# Patient Record
Sex: MALE | Race: WHITE | NOT HISPANIC OR LATINO | URBAN - METROPOLITAN AREA
[De-identification: names, ages, dates, MRNs, and addresses within clinical notes are randomized per-mention and may not be internally consistent; named-entity substitution may affect disease eponyms.]

---

## 2018-07-25 ENCOUNTER — APPOINTMENT (OUTPATIENT)
Dept: RADIOLOGY | Facility: MEDICAL CENTER | Age: 68
End: 2018-07-25
Attending: EMERGENCY MEDICINE
Payer: MEDICARE

## 2018-07-25 ENCOUNTER — HOSPITAL ENCOUNTER (OUTPATIENT)
Dept: RADIOLOGY | Facility: MEDICAL CENTER | Age: 68
End: 2018-07-25

## 2018-07-25 ENCOUNTER — HOSPITAL ENCOUNTER (OUTPATIENT)
Facility: MEDICAL CENTER | Age: 68
End: 2018-07-26
Attending: EMERGENCY MEDICINE | Admitting: INTERNAL MEDICINE
Payer: MEDICARE

## 2018-07-25 DIAGNOSIS — E78.5 DYSLIPIDEMIA: ICD-10-CM

## 2018-07-25 DIAGNOSIS — G45.1 HEMISPHERIC CAROTID ARTERY SYNDROME: ICD-10-CM

## 2018-07-25 DIAGNOSIS — I10 ESSENTIAL HYPERTENSION: ICD-10-CM

## 2018-07-25 PROCEDURE — 700102 HCHG RX REV CODE 250 W/ 637 OVERRIDE(OP): Performed by: INTERNAL MEDICINE

## 2018-07-25 PROCEDURE — A9270 NON-COVERED ITEM OR SERVICE: HCPCS | Performed by: INTERNAL MEDICINE

## 2018-07-25 PROCEDURE — G0378 HOSPITAL OBSERVATION PER HR: HCPCS

## 2018-07-25 PROCEDURE — 99220 PR INITIAL OBSERVATION CARE,LEVL III: CPT | Performed by: INTERNAL MEDICINE

## 2018-07-25 PROCEDURE — 93306 TTE W/DOPPLER COMPLETE: CPT | Mod: 26 | Performed by: INTERNAL MEDICINE

## 2018-07-25 PROCEDURE — 93005 ELECTROCARDIOGRAM TRACING: CPT | Performed by: EMERGENCY MEDICINE

## 2018-07-25 PROCEDURE — 71045 X-RAY EXAM CHEST 1 VIEW: CPT

## 2018-07-25 PROCEDURE — 94760 N-INVAS EAR/PLS OXIMETRY 1: CPT

## 2018-07-25 PROCEDURE — 99285 EMERGENCY DEPT VISIT HI MDM: CPT

## 2018-07-25 RX ORDER — ASPIRIN 81 MG/1
324 TABLET, CHEWABLE ORAL DAILY
Status: DISCONTINUED | OUTPATIENT
Start: 2018-07-26 | End: 2018-07-26 | Stop reason: HOSPADM

## 2018-07-25 RX ORDER — ACETAMINOPHEN 325 MG/1
650 TABLET ORAL EVERY 6 HOURS PRN
Status: DISCONTINUED | OUTPATIENT
Start: 2018-07-25 | End: 2018-07-26 | Stop reason: HOSPADM

## 2018-07-25 RX ORDER — LABETALOL HYDROCHLORIDE 5 MG/ML
10 INJECTION, SOLUTION INTRAVENOUS EVERY 4 HOURS PRN
Status: DISCONTINUED | OUTPATIENT
Start: 2018-07-25 | End: 2018-07-26 | Stop reason: HOSPADM

## 2018-07-25 RX ORDER — ASPIRIN 325 MG
325 TABLET ORAL DAILY
Status: DISCONTINUED | OUTPATIENT
Start: 2018-07-26 | End: 2018-07-26 | Stop reason: HOSPADM

## 2018-07-25 RX ORDER — BISACODYL 10 MG
10 SUPPOSITORY, RECTAL RECTAL
Status: DISCONTINUED | OUTPATIENT
Start: 2018-07-25 | End: 2018-07-26 | Stop reason: HOSPADM

## 2018-07-25 RX ORDER — CLOPIDOGREL BISULFATE 75 MG/1
75 TABLET ORAL EVERY EVENING
COMMUNITY

## 2018-07-25 RX ORDER — ONDANSETRON 4 MG/1
4 TABLET, ORALLY DISINTEGRATING ORAL EVERY 4 HOURS PRN
Status: DISCONTINUED | OUTPATIENT
Start: 2018-07-25 | End: 2018-07-26 | Stop reason: HOSPADM

## 2018-07-25 RX ORDER — ATORVASTATIN CALCIUM 40 MG/1
40 TABLET, FILM COATED ORAL EVERY MORNING
COMMUNITY

## 2018-07-25 RX ORDER — LISINOPRIL 20 MG/1
20 TABLET ORAL EVERY MORNING
COMMUNITY

## 2018-07-25 RX ORDER — ATORVASTATIN CALCIUM 80 MG/1
80 TABLET, FILM COATED ORAL EVERY EVENING
Status: DISCONTINUED | OUTPATIENT
Start: 2018-07-25 | End: 2018-07-26 | Stop reason: HOSPADM

## 2018-07-25 RX ORDER — ASPIRIN 300 MG/1
300 SUPPOSITORY RECTAL DAILY
Status: DISCONTINUED | OUTPATIENT
Start: 2018-07-26 | End: 2018-07-26 | Stop reason: HOSPADM

## 2018-07-25 RX ORDER — AMOXICILLIN 250 MG
2 CAPSULE ORAL 2 TIMES DAILY
Status: DISCONTINUED | OUTPATIENT
Start: 2018-07-25 | End: 2018-07-26 | Stop reason: HOSPADM

## 2018-07-25 RX ORDER — POLYETHYLENE GLYCOL 3350 17 G/17G
1 POWDER, FOR SOLUTION ORAL
Status: DISCONTINUED | OUTPATIENT
Start: 2018-07-25 | End: 2018-07-26 | Stop reason: HOSPADM

## 2018-07-25 RX ORDER — ONDANSETRON 2 MG/ML
4 INJECTION INTRAMUSCULAR; INTRAVENOUS EVERY 4 HOURS PRN
Status: DISCONTINUED | OUTPATIENT
Start: 2018-07-25 | End: 2018-07-26 | Stop reason: HOSPADM

## 2018-07-25 RX ADMIN — ATORVASTATIN CALCIUM 80 MG: 80 TABLET, FILM COATED ORAL at 23:06

## 2018-07-25 ASSESSMENT — ENCOUNTER SYMPTOMS
SORE THROAT: 0
SPUTUM PRODUCTION: 0
FEVER: 0
PALPITATIONS: 0
VOMITING: 0
DIZZINESS: 0
SHORTNESS OF BREATH: 0
HEADACHES: 1
ABDOMINAL PAIN: 0
BLURRED VISION: 0
WHEEZING: 0
DIAPHORESIS: 0
MYALGIAS: 0
BACK PAIN: 0
COUGH: 0
DIARRHEA: 0
FLANK PAIN: 0
NECK PAIN: 0
SPEECH CHANGE: 1
FOCAL WEAKNESS: 0
NAUSEA: 0
CHILLS: 0
SEIZURES: 0
BRUISES/BLEEDS EASILY: 0
BLOOD IN STOOL: 0

## 2018-07-25 ASSESSMENT — COGNITIVE AND FUNCTIONAL STATUS - GENERAL
SUGGESTED CMS G CODE MODIFIER MOBILITY: CH
MOBILITY SCORE: 24
SUGGESTED CMS G CODE MODIFIER DAILY ACTIVITY: CH
DAILY ACTIVITIY SCORE: 24

## 2018-07-25 ASSESSMENT — LIFESTYLE VARIABLES
HAVE PEOPLE ANNOYED YOU BY CRITICIZING YOUR DRINKING: NO
EVER FELT BAD OR GUILTY ABOUT YOUR DRINKING: NO
ON A TYPICAL DAY WHEN YOU DRINK ALCOHOL HOW MANY DRINKS DO YOU HAVE: 1
TOTAL SCORE: 0
AVERAGE NUMBER OF DAYS PER WEEK YOU HAVE A DRINK CONTAINING ALCOHOL: 5
TOTAL SCORE: 0
HOW MANY TIMES IN THE PAST YEAR HAVE YOU HAD 5 OR MORE DRINKS IN A DAY: 0
CONSUMPTION TOTAL: NEGATIVE
TOTAL SCORE: 0
EVER HAD A DRINK FIRST THING IN THE MORNING TO STEADY YOUR NERVES TO GET RID OF A HANGOVER: NO
HAVE YOU EVER FELT YOU SHOULD CUT DOWN ON YOUR DRINKING: NO
EVER_SMOKED: NEVER
ALCOHOL_USE: YES

## 2018-07-25 ASSESSMENT — PATIENT HEALTH QUESTIONNAIRE - PHQ9
2. FEELING DOWN, DEPRESSED, IRRITABLE, OR HOPELESS: NOT AT ALL
1. LITTLE INTEREST OR PLEASURE IN DOING THINGS: NOT AT ALL
SUM OF ALL RESPONSES TO PHQ9 QUESTIONS 1 AND 2: 0

## 2018-07-25 ASSESSMENT — COPD QUESTIONNAIRES
DO YOU EVER COUGH UP ANY MUCUS OR PHLEGM?: NO/ONLY WITH OCCASIONAL COLDS OR INFECTIONS
HAVE YOU SMOKED AT LEAST 100 CIGARETTES IN YOUR ENTIRE LIFE: NO/DON'T KNOW
COPD SCREENING SCORE: 2
IN THE PAST 12 MONTHS DO YOU DO LESS THAN YOU USED TO BECAUSE OF YOUR BREATHING PROBLEMS: DISAGREE/UNSURE
DURING THE PAST 4 WEEKS HOW MUCH DID YOU FEEL SHORT OF BREATH: NONE/LITTLE OF THE TIME

## 2018-07-25 ASSESSMENT — PAIN SCALES - GENERAL
PAINLEVEL_OUTOF10: 0

## 2018-07-26 ENCOUNTER — APPOINTMENT (OUTPATIENT)
Dept: RADIOLOGY | Facility: MEDICAL CENTER | Age: 68
End: 2018-07-26
Attending: INTERNAL MEDICINE
Payer: MEDICARE

## 2018-07-26 VITALS
SYSTOLIC BLOOD PRESSURE: 132 MMHG | RESPIRATION RATE: 18 BRPM | WEIGHT: 168.43 LBS | DIASTOLIC BLOOD PRESSURE: 89 MMHG | OXYGEN SATURATION: 97 % | HEART RATE: 61 BPM | TEMPERATURE: 99 F | BODY MASS INDEX: 27.07 KG/M2 | HEIGHT: 66 IN

## 2018-07-26 PROBLEM — I10 ESSENTIAL HYPERTENSION: Status: ACTIVE | Noted: 2018-07-26

## 2018-07-26 PROBLEM — G45.9 TIA (TRANSIENT ISCHEMIC ATTACK): Status: RESOLVED | Noted: 2018-07-26 | Resolved: 2018-07-26

## 2018-07-26 PROBLEM — E78.5 HYPERLIPIDEMIA: Status: ACTIVE | Noted: 2018-07-26

## 2018-07-26 PROBLEM — G45.9 TIA (TRANSIENT ISCHEMIC ATTACK): Status: ACTIVE | Noted: 2018-07-26

## 2018-07-26 LAB
ALBUMIN SERPL BCP-MCNC: 4.3 G/DL (ref 3.2–4.9)
ALBUMIN/GLOB SERPL: 2.2 G/DL
ALP SERPL-CCNC: 49 U/L (ref 30–99)
ALT SERPL-CCNC: 23 U/L (ref 2–50)
ANION GAP SERPL CALC-SCNC: 10 MMOL/L (ref 0–11.9)
AST SERPL-CCNC: 26 U/L (ref 12–45)
BASOPHILS # BLD AUTO: 0.7 % (ref 0–1.8)
BASOPHILS # BLD: 0.05 K/UL (ref 0–0.12)
BILIRUB SERPL-MCNC: 1.2 MG/DL (ref 0.1–1.5)
BUN SERPL-MCNC: 15 MG/DL (ref 8–22)
CALCIUM SERPL-MCNC: 9.4 MG/DL (ref 8.5–10.5)
CHLORIDE SERPL-SCNC: 104 MMOL/L (ref 96–112)
CHOLEST SERPL-MCNC: 165 MG/DL (ref 100–199)
CO2 SERPL-SCNC: 24 MMOL/L (ref 20–33)
CREAT SERPL-MCNC: 1.08 MG/DL (ref 0.5–1.4)
EOSINOPHIL # BLD AUTO: 0.2 K/UL (ref 0–0.51)
EOSINOPHIL NFR BLD: 2.9 % (ref 0–6.9)
ERYTHROCYTE [DISTWIDTH] IN BLOOD BY AUTOMATED COUNT: 45.5 FL (ref 35.9–50)
EST. AVERAGE GLUCOSE BLD GHB EST-MCNC: 120 MG/DL
GLOBULIN SER CALC-MCNC: 2 G/DL (ref 1.9–3.5)
GLUCOSE SERPL-MCNC: 101 MG/DL (ref 65–99)
HBA1C MFR BLD: 5.8 % (ref 0–5.6)
HCT VFR BLD AUTO: 42.8 % (ref 42–52)
HDLC SERPL-MCNC: 47 MG/DL
HGB BLD-MCNC: 14.8 G/DL (ref 14–18)
IMM GRANULOCYTES # BLD AUTO: 0.01 K/UL (ref 0–0.11)
IMM GRANULOCYTES NFR BLD AUTO: 0.1 % (ref 0–0.9)
LDLC SERPL CALC-MCNC: 82 MG/DL
LV EJECT FRACT  99904: 60
LV EJECT FRACT MOD 2C 99903: 65.79
LV EJECT FRACT MOD 4C 99902: 63.45
LV EJECT FRACT MOD BP 99901: 64.58
LYMPHOCYTES # BLD AUTO: 2.29 K/UL (ref 1–4.8)
LYMPHOCYTES NFR BLD: 32.7 % (ref 22–41)
MCH RBC QN AUTO: 30.8 PG (ref 27–33)
MCHC RBC AUTO-ENTMCNC: 34.6 G/DL (ref 33.7–35.3)
MCV RBC AUTO: 89.2 FL (ref 81.4–97.8)
MONOCYTES # BLD AUTO: 0.75 K/UL (ref 0–0.85)
MONOCYTES NFR BLD AUTO: 10.7 % (ref 0–13.4)
NEUTROPHILS # BLD AUTO: 3.71 K/UL (ref 1.82–7.42)
NEUTROPHILS NFR BLD: 52.9 % (ref 44–72)
NRBC # BLD AUTO: 0 K/UL
NRBC BLD-RTO: 0 /100 WBC
PLATELET # BLD AUTO: 280 K/UL (ref 164–446)
PMV BLD AUTO: 9.4 FL (ref 9–12.9)
POTASSIUM SERPL-SCNC: 3.7 MMOL/L (ref 3.6–5.5)
PROT SERPL-MCNC: 6.3 G/DL (ref 6–8.2)
RBC # BLD AUTO: 4.8 M/UL (ref 4.7–6.1)
SODIUM SERPL-SCNC: 138 MMOL/L (ref 135–145)
TRIGL SERPL-MCNC: 182 MG/DL (ref 0–149)
TSH SERPL DL<=0.005 MIU/L-ACNC: 1.92 UIU/ML (ref 0.38–5.33)
WBC # BLD AUTO: 7 K/UL (ref 4.8–10.8)

## 2018-07-26 PROCEDURE — 70544 MR ANGIOGRAPHY HEAD W/O DYE: CPT

## 2018-07-26 PROCEDURE — 80053 COMPREHEN METABOLIC PANEL: CPT

## 2018-07-26 PROCEDURE — 99217 PR OBSERVATION CARE DISCHARGE: CPT | Performed by: FAMILY MEDICINE

## 2018-07-26 PROCEDURE — 94760 N-INVAS EAR/PLS OXIMETRY 1: CPT

## 2018-07-26 PROCEDURE — 700111 HCHG RX REV CODE 636 W/ 250 OVERRIDE (IP): Performed by: FAMILY MEDICINE

## 2018-07-26 PROCEDURE — G8987 SELF CARE CURRENT STATUS: HCPCS | Mod: CI

## 2018-07-26 PROCEDURE — 70551 MRI BRAIN STEM W/O DYE: CPT

## 2018-07-26 PROCEDURE — G0378 HOSPITAL OBSERVATION PER HR: HCPCS

## 2018-07-26 PROCEDURE — G8978 MOBILITY CURRENT STATUS: HCPCS | Mod: CH

## 2018-07-26 PROCEDURE — A9270 NON-COVERED ITEM OR SERVICE: HCPCS | Performed by: INTERNAL MEDICINE

## 2018-07-26 PROCEDURE — G8988 SELF CARE GOAL STATUS: HCPCS | Mod: CI

## 2018-07-26 PROCEDURE — 97165 OT EVAL LOW COMPLEX 30 MIN: CPT

## 2018-07-26 PROCEDURE — 96372 THER/PROPH/DIAG INJ SC/IM: CPT

## 2018-07-26 PROCEDURE — 80061 LIPID PANEL: CPT

## 2018-07-26 PROCEDURE — 700111 HCHG RX REV CODE 636 W/ 250 OVERRIDE (IP): Performed by: INTERNAL MEDICINE

## 2018-07-26 PROCEDURE — 700102 HCHG RX REV CODE 250 W/ 637 OVERRIDE(OP): Performed by: INTERNAL MEDICINE

## 2018-07-26 PROCEDURE — G8979 MOBILITY GOAL STATUS: HCPCS | Mod: CH

## 2018-07-26 PROCEDURE — G8980 MOBILITY D/C STATUS: HCPCS | Mod: CH

## 2018-07-26 PROCEDURE — 95951 EEG: CPT | Mod: 52

## 2018-07-26 PROCEDURE — G8989 SELF CARE D/C STATUS: HCPCS | Mod: CI

## 2018-07-26 PROCEDURE — 93306 TTE W/DOPPLER COMPLETE: CPT

## 2018-07-26 PROCEDURE — 97161 PT EVAL LOW COMPLEX 20 MIN: CPT

## 2018-07-26 PROCEDURE — 85025 COMPLETE CBC W/AUTO DIFF WBC: CPT

## 2018-07-26 PROCEDURE — 96374 THER/PROPH/DIAG INJ IV PUSH: CPT

## 2018-07-26 PROCEDURE — 83036 HEMOGLOBIN GLYCOSYLATED A1C: CPT

## 2018-07-26 PROCEDURE — 36415 COLL VENOUS BLD VENIPUNCTURE: CPT

## 2018-07-26 PROCEDURE — 84443 ASSAY THYROID STIM HORMONE: CPT

## 2018-07-26 PROCEDURE — 70547 MR ANGIOGRAPHY NECK W/O DYE: CPT

## 2018-07-26 RX ORDER — LORAZEPAM 2 MG/ML
0.5 INJECTION INTRAMUSCULAR
Status: COMPLETED | OUTPATIENT
Start: 2018-07-26 | End: 2018-07-26

## 2018-07-26 RX ADMIN — LORAZEPAM 0.5 MG: 2 INJECTION INTRAMUSCULAR; INTRAVENOUS at 09:03

## 2018-07-26 RX ADMIN — ASPIRIN 325 MG: 325 TABLET ORAL at 05:24

## 2018-07-26 RX ADMIN — ENOXAPARIN SODIUM 40 MG: 100 INJECTION SUBCUTANEOUS at 05:24

## 2018-07-26 ASSESSMENT — GAIT ASSESSMENTS
GAIT LEVEL OF ASSIST: INDEPENDENT
DEVIATION: OTHER (COMMENT)
DISTANCE (FEET): 250

## 2018-07-26 ASSESSMENT — COGNITIVE AND FUNCTIONAL STATUS - GENERAL
SUGGESTED CMS G CODE MODIFIER DAILY ACTIVITY: CH
MOBILITY SCORE: 24
DAILY ACTIVITIY SCORE: 24
SUGGESTED CMS G CODE MODIFIER MOBILITY: CH

## 2018-07-26 ASSESSMENT — PAIN SCALES - GENERAL
PAINLEVEL_OUTOF10: 0

## 2018-07-26 ASSESSMENT — LIFESTYLE VARIABLES: EVER_SMOKED: NEVER

## 2018-07-26 ASSESSMENT — COPD QUESTIONNAIRES
DO YOU EVER COUGH UP ANY MUCUS OR PHLEGM?: NO/ONLY WITH OCCASIONAL COLDS OR INFECTIONS
COPD SCREENING SCORE: 2
DURING THE PAST 4 WEEKS HOW MUCH DID YOU FEEL SHORT OF BREATH: NONE/LITTLE OF THE TIME
HAVE YOU SMOKED AT LEAST 100 CIGARETTES IN YOUR ENTIRE LIFE: NO/DON'T KNOW

## 2018-07-26 NOTE — THERAPY
"Physical Therapy Evaluation completed.   Bed Mobility:  Supine to Sit: Independent  Transfers: Sit to Stand: Independent  Gait: Level Of Assist: Independent with No Equipment Needed       Plan of Care: Patient with no further skilled PT needs in the acute care setting at this time  Discharge Recommendations: Equipment: No Equipment Needed.    See \"Rehab Therapy-Acute\" Patient Summary Report for complete documentation.     Pt presented to PT with possible TIA, aphaisa, confusion, HA, and blurry vision; which appears to have all resolved. No apparent deficits noted during neurological exam. Pt was able to demonstrate I to Mod I with functional mobility with no apparent LOB during high dynamic balance activities. Pt is in no need for acute skilled PT orders at this time. Antcipate pt to d/c home once medically clear.   "

## 2018-07-26 NOTE — PROGRESS NOTES
2 RN Skin Complete with MOHSEN Alexander.     Pt has flaky scales throughout his body, which is baseline for the pt. The scales are at various stages of healing, some are red and some are white. All other skin is intact.

## 2018-07-26 NOTE — PROGRESS NOTES
Patient off unit to MRI. Order to be off tele in place, monitor room notified. PRN Ativan given prior to MRI per order.

## 2018-07-26 NOTE — ASSESSMENT & PLAN NOTE
CT head without contrast was negative for acute abnormality  Patient will be placed on continuous cardiac monitoring to evaluate for any arrhythmias  I will order MRI brain, MRA head and neck  Check 2-D echo  Patient will be started on full dose aspirin and high intensity statin  Allow permissive hypertension  Check TSH, lipid panel and hemoglobin A1c  PTOT and ST evaluation

## 2018-07-26 NOTE — CARE PLAN
Problem: Safety  Goal: Will remain free from falls  Outcome: PROGRESSING AS EXPECTED  Pt remains free from falls at this time. Safety precautions in place. Pt educated on calling for assistance when needed.     Problem: Knowledge Deficit  Goal: Knowledge of disease process/condition, treatment plan, diagnostic tests, and medications will improve  Outcome: PROGRESSING AS EXPECTED  Patient is educated of disease process and condition. Treatment team has included patient in plan of care. All medications indications and side effects are explained. Patient is encouraged to ask questions. Patient indicates understanding.

## 2018-07-26 NOTE — H&P
Hospital Medicine History & Physical Note    Date of Service  7/25/2018    Primary Care Physician  No primary care provider on file.    Consultants  Neurology Dr Ferrari    Code Status  Full Code    Chief Complaint  Aphasia    History of Presenting Illness  68 y.o. male with a past medical history of TIA, hypertension, dyslipidemia who presented 7/25/2018 with aphasia that started at 10 AM this morning.  The patient suddenly developed a diffuse headache followed by confusion which was witnessed by his wife.  Patient also demonstrated difficulty speaking.  He was able to make intelligible sentences but was very slow to express himself.  The symptoms lasted for about 15 minutes.  At the time he also complained of blurred vision.  He presented to an outlying facility where a CT head without contrast was unremarkable.  He was given aspirin and Plavix and was sent to [facility] for further workup.  At this time he continues to report a low-grade headache.  He denies any vision change, muscle weakness, numbness, fevers, chest pain, shortness of breath, facial droop or aphasia at this time.  Patient does not smoke cigarettes.  He has been compliant with his Plavix, statin and blood pressure medications.  EKG independently reviewed by me reveals sinus bradycardia with no acute ischemic changes  Chest x-ray independently reviewed by me reveals no acute cardiopulmonary process    Review of Systems  Review of Systems   Constitutional: Negative for chills, diaphoresis and fever.   HENT: Negative for hearing loss and sore throat.    Eyes: Negative for blurred vision.   Respiratory: Negative for cough, sputum production, shortness of breath and wheezing.    Cardiovascular: Negative for chest pain, palpitations and leg swelling.   Gastrointestinal: Negative for abdominal pain, blood in stool, diarrhea, nausea and vomiting.   Genitourinary: Negative for dysuria, flank pain and urgency.   Musculoskeletal: Negative for back pain, joint  pain, myalgias and neck pain.   Skin: Negative for rash.   Neurological: Positive for speech change and headaches. Negative for dizziness, focal weakness and seizures.   Endo/Heme/Allergies: Does not bruise/bleed easily.   Psychiatric/Behavioral: Negative for suicidal ideas.   All other systems reviewed and are negative.      Past Medical History  TIA, hypertension and dyslipidemia    Surgical History  Left knee surgery    Family History  History reviewed.  No pertinent family history    Social History   Denies smoking.  Drinks 1-2 glasses of wine per day    Allergies  No Known Allergies    Medications  Prior to Admission Medications   Prescriptions Last Dose Informant Patient Reported? Taking?   atorvastatin (LIPITOR) 40 MG Tab 7/25/2018 at 0730 Patient Yes Yes   Sig: Take 40 mg by mouth every morning.   clopidogrel (PLAVIX) 75 MG Tab 7/24/2018 at 2100 Patient Yes Yes   Sig: Take 75 mg by mouth every evening.   lisinopril (PRINIVIL) 20 MG Tab 7/25/2018 at 0730 Patient Yes Yes   Sig: Take 20 mg by mouth every morning.      Facility-Administered Medications: None       Physical Exam  Blood Pressure : 132/72   Temperature: 36.8 °C (98.3 °F)   Pulse: 72   Respiration: 18   Pulse Oximetry: 98 %     Physical Exam   Constitutional: He is oriented to person, place, and time. He appears well-developed and well-nourished. No distress.   HENT:   Head: Normocephalic and atraumatic.   Mouth/Throat: Oropharynx is clear and moist.   Eyes: Pupils are equal, round, and reactive to light. Conjunctivae are normal. No scleral icterus.   Neck: Normal range of motion. Neck supple.   Cardiovascular: Normal rate, regular rhythm and normal heart sounds.    Pulmonary/Chest: Effort normal and breath sounds normal. No respiratory distress. He has no wheezes. He has no rales.   Abdominal: Soft. Bowel sounds are normal. He exhibits no distension. There is no tenderness. There is no rebound.   Musculoskeletal: Normal range of motion. He exhibits  no edema or tenderness.   Lymphadenopathy:     He has no cervical adenopathy.   Neurological: He is alert and oriented to person, place, and time. No cranial nerve deficit. Coordination normal.   Strength and sensation intact bilaterally   Skin: Skin is warm. No rash noted.   Psychiatric: He has a normal mood and affect. His behavior is normal.   Nursing note and vitals reviewed.      Laboratory:          No results for input(s): ALTSGPT, ASTSGOT, ALKPHOSPHAT, TBILIRUBIN, DBILIRUBIN, GAMMAGT, AMYLASE, LIPASE, ALB, PREALBUMIN, GLUCOSE in the last 72 hours.              No results found for: TROPONINI    Urinalysis:    No results found for: SPECGRAVITY, GLUCOSEUR, KETONES, NITRITE, WBCURINE, RBCURINE, BACTERIA, EPITHELCELL     Imaging:  DX-CHEST-LIMITED (1 VIEW)   Final Result         1.  No acute cardiopulmonary disease.      OUTSIDE IMAGES-CT HEAD   Final Result      Echocardiogram Comp W/O cont    (Results Pending)   MR-BRAIN-W/O    (Results Pending)   MR-MRA HEAD-W/O    (Results Pending)   MR-MRA NECK-W/O    (Results Pending)         Assessment/Plan:  I anticipate this patient will require at least two midnights for appropriate medical management, necessitating inpatient admission.    TIA (transient ischemic attack)- (present on admission)   Assessment & Plan    CT head without contrast was negative for acute abnormality  Patient will be placed on continuous cardiac monitoring to evaluate for any arrhythmias  I will order MRI brain, MRA head and neck  Check 2-D echo  Patient will be started on full dose aspirin and high intensity statin  Allow permissive hypertension  Check TSH, lipid panel and hemoglobin A1c  PTOT and ST evaluation            Hyperlipidemia- (present on admission)   Assessment & Plan    Started on Lipitor 80 mg daily        Essential hypertension- (present on admission)   Assessment & Plan    Allow permissive hypertension            VTE prophylaxis: Lovenox

## 2018-07-26 NOTE — ED TRIAGE NOTES
"Chief Complaint   Patient presents with   • Sent by MD     transfer from [facility]. [facility] this am, went home and didn't feel great. reports he too a nap, woke up \"out of it\", with expressive asphasia and feeling confused. hx of TIAs (x2).      Pt ambulatory to triage with above. Charge RN notified. PIV in place from previous facility. Denies pain. Speaking in clear complete sentences. Neuro intact.    Pt to [facility]. Educated on triage process and to inform staff of any changes.     /72   Pulse 72   Temp 36.8 °C (98.3 °F)   Resp 18   Ht 1.676 m (5' 6\")   Wt 78.1 kg (172 lb 2.9 oz)   SpO2 98%   BMI 27.79 kg/m²     "

## 2018-07-26 NOTE — PROGRESS NOTES
Bedside report completed. Received pt from ED. NIH stroke scale done with NASIM Neri RN. NIH stroke scale 0.  Pt lying in bed comfortably. Family at bedside. A/O x 4, pain 0/10. Safety precautions in place. Call light and personal belongings within reach. Pt educated on POC and all questions answered at this time.  Educated patient on calling for assistance when needed. All pt needs are met at this time.  Will continue to monitor.

## 2018-07-26 NOTE — PROGRESS NOTES
Report received, pt care assumed, tele box on. Pt aaox4, no signs of distress noted at this time. POC discussed with pt and verbalizes no questions. Pt denies any additional needs at this time. Bed in lowest position, pt educated on fall risk and verbalized understanding, call light within reach, will continue to monitor.

## 2018-07-26 NOTE — ED NOTES
Med rec complete per pt at bedside  Allergies reviewed - JONH  Pt finished a z-ajith last Thursday and a medrol dose ajith on Friday    Pt normally takes his Plavix at night but was given a dose by the outlying hospital earlier today

## 2018-07-26 NOTE — PROGRESS NOTES
" MD Dr. Inman paged regarding the pt requesting ativan for his MRI as he states he gets \"claustrophobic\" in MRI. New orders for Ativan 0.5 mg IV once to be given 15 minutes prior to the MRI procedure. Also, new orders given that pt is able to come off tele for MRI.   "

## 2018-07-26 NOTE — ED PROVIDER NOTES
"ED Provider Note    Scribed for Amilcar Romero M.D. by Anny Webster. 7/25/2018  7:19 PM    Primary care provider: No primary care provider on file.  Means of arrival: Walk in  History obtained from: Patient  History limited by: None    CHIEF COMPLAINT  Chief Complaint   Patient presents with   • Sent by MD     transfer from [facility]. [facility] this am, went home and didn't feel great. reports he too a nap, woke up \"out of it\", with expressive asphasia and feeling confused. hx of TIAs (x2).        HPI  Alfred Fagan is a 68 y.o. male with a history of TIA x2 who presents today as a transfer from [facility] for evaluation of general disorientation and mild aphasia, onset at 10:00AM today. His symptoms were most severe for 15 minutes post symptom onset.  Total symptom duration 1 hour.  Patient returned from [facility] \"pretty hard\" on [facility] [place] this past weekend. He had oatmeal this morning, felt generally ill and tired, then took a 15 minute nap. After his nap, the patient had difficulty focusing his vision and keeping track of conversation. He was forming full, intelligible sentences but very slowly, per wife. He was seen at [facility] and received a CT which was unremarkable. He was given Aspirin. His lab work was normal. Subsequently the patient was sent here. He feels normal now aside from a \"low-grade headache\". Patient had a TIA 1 year ago and was placed on Plavix. He denies any leg swelling, fevers, or cough. There is no history of A-Fib, CHF, CAD, or HLD. He does not smoke.    REVIEW OF SYSTEMS  Pertinent positives include: disorientation, mild aphasia, blurred vision, confusion, headache.  Pertinent negatives include: leg swelling, fevers, or cough.  10+systems reviewed and are negative  C.     PAST MEDICAL HISTORY  TIA x2    CURRENT MEDICATIONS  Plavix    ALLERGIES  No Known Allergies    PHYSICAL EXAM  VITAL SIGNS: /72   Pulse 72   Temp 36.8 °C (98.3 °F)   Resp 18   Ht 1.676 m (5' 6\")   " Wt 78.1 kg (172 lb 2.9 oz)   SpO2 98%   BMI 27.79 kg/m²  Reviewed and borderline blood pressure elevation    Constitutional: Well developed, Well nourished, No acute distress, Able to answer questions.   HENT: Moist mucous membranes, Nose is normal in appearance without rhinorrhea, external ears are normal  Eyes: Anicteric,  pupils are equal round and reactive, there is no conjunctival drainage or pallor   Neck: The trachea is midline, there is no obvious mass or meningeal signs  Cardiovascular:  Normal perfusion.  regular rate and rhythm without murmurs gallops or rubs  Thorax & Lungs: Respiratory rate and effort are normal. There is normal chest excursion with respiration. No wheezes rhonchi or rales noted.  Abdomen: Abdomen is normal in appearance, no gross peritoneal signs, normal bowel sounds, no pain with cough, nonpulsatile  Musculoskeletal: No deformities noted in all 4 extremities. No edema present.   Skin: Skin is warm, visualized skin shows no erythema, no rash.  Neurologic:  NIH score 0. Cranial nerves II through XII are intact, speech is fluent, there is no focal abnormality noted.  Psychiatric: Normal mood and mentation.      DIFFERENTIALS:  TIA, CVA, doubt hemorrhagic stroke on the seizure.    RADIOLOGY:  DX-CHEST-LIMITED (1 VIEW)   Final Result         1.  No acute cardiopulmonary disease.      OUTSIDE IMAGES-CT HEAD   Final Result      Radiology results and radiologist interpretation reviewed by me.     LABORATORY:  CBC, CMP, sed rate, coags, troponin all normal from [place]  Lab results reviewed by me.     EKG Interpretation:  Interpreted by me    Rhythm:  Normal sinus rhythm   Rate: Bradycardic  Axis: normal  Ectopy: none  Conduction: normal  ST Segments: no acute change  T Waves: no acute change  Q Waves: none  Clinical Impression: Sinus bradycardia      ED COURSE:    7:19 PM - Patient seen and examined at bedside.  Ordered DX Chest, EKG to evaluate. Will consult Neurology for  recommendations.     7:38 PM - I discussed the patient's case with Dr. Ferrari (Neurology). Lesa Hospitalist.    8:04 PM - Discussed the patient's case and above findings with Dr. Mckeon, Hospitalist, who agreed to admit patient.       MEDICAL DECISION MAKING:  This patient presents with a TIA and requires admission for repeat stroke workup.  Seizure is also in the differential in the G will be obtained during this admission.  There is no evidence of stroke hemorrhagic stroke or brain mass.    DISPOSITION:  Patient will be admitted to Dr. Mckeon, Hospitalist, in guarded condition.      PLAN:  As above    CONDITION:  Guarded    FINAL IMPRESSION:  1. Hemispheric carotid artery syndrome    2. Essential hypertension    3. Dyslipidemia         I, Anny Webster (Scribe), am scribing for, and in the presence of, Amilcar Romero M.D..    Electronically signed by: Anny Webster (Scribe), 7/25/2018    Electronically signed by: Anny Webster, 7/25/2018    The note accurately reflects work and decisions made by me.  Amilcar Romero  7/25/2018  10:21 PM

## 2018-07-27 LAB — EKG IMPRESSION: NORMAL

## 2018-07-27 NOTE — PROGRESS NOTES
Discharge instructions given to patient at bedside, verbalizes understanding and states plans for follow-up with neurology. New and home medication review, post-discharge activity level and worsening of symptoms needing follow-up care discussed. Telemetry monitor/IV cathlon removed. All belongings accounted for, all questions answered at this time. Patient wants to eat dinner first before discharge.

## 2018-07-27 NOTE — PROCEDURES
DATE OF SERVICE:  07/26/2018    This is an inpatient EEG was done 07/26/2018.    ROUTINE VIDEO ELECTROENCEPHALOGRAM REPORT        NAME: Alfred Rossichtel     REFERRING Dr:DR. CAMARENA     DURATION: 27 minutes     INDICATION: Aphasia        TECHNIQUE: 30 channel routine electroencephalogram (EEG) was performed in accordance with the international 10-20 system. The study was reviewed in bipolar and referential montages. The recording examined the patient during wakeful and drowsy state(s).      DESCRIPTION OF THE RECORD:        Background rhythm during awake stage shows well-organized, well-developed, average voltage 10 to 11 hertz alpha activity in the posterior regions.  It blocks with eye opening and it is bilaterally synchronous and symmetrical.  No spike-and-wave discharges or any lateralizing abnormalities are seen.  Photic stimulation did not produce any abnormalities. No abnormalities were found during the procedure. Stage I sleep was achieved.        ACTIVATION PROCEDURES:       Photic Stimulation were done     ICTAL AND/OR INTERICTAL FINDINGS:    No focal or generalized epileptiform activity noted. No regional slowing was seen during this routine study.  No clinical events or seizures were reported or recorded during the study.       EKG: sampling of the EKG recording demonstrated sinus rhythm.          INTERPRETATION:        ________________________________________________________________________     This is normal routine video EEG recording in the awake and drowsy/sleep state(s).     This scalp video EEG remains not remarkable     Of note, unremarkable EEG does not completely exclude the diagnosis  of seizures since seizure is an episodic phenomena and frontal lobe seizures could have normal scalp EEG. Clinical correlation may help     If clinical suspicion of seizure remains high.  Prolonged outpatient EEG   monitoring may be of help.     EEG 07/26/18 10:09  PM  ________________________________________________________________________          ____________________________________     MD TAYLOR HORTA    DD:  07/26/2018 22:11:00  DT:  07/26/2018 23:33:22    D#:  [id number]  Job#:  [id number]

## 2018-07-27 NOTE — DISCHARGE SUMMARY
"Discharge Summary    CHIEF COMPLAINT ON ADMISSION  Chief Complaint   Patient presents with   • Sent by MD     transfer from [facility]. [facility] this am, went home and didn't feel great. reports he too a nap, woke up \"out of it\", with expressive asphasia and feeling confused. hx of TIAs (x2).        Reason for Admission  Sent by MD     Admission Date  7/25/2018    CODE STATUS  Full Code    HPI & HOSPITAL COURSE  This is a 68 y.o. male with history of previous TIA, hypertension, hyperlipidemia who came in with slow speech along with confusion and vision changes.  According to the patient and wife at bedside, patient had slowed speech and was confused.  No neurological focal deficit or numbness reported by the patient.  Patient has similar presentation in the past with [place] speech noted.  Workup at that time was negative for any abnormalities.  However, patient was placed on Plavix and statin at that time.  On this hospitalization, CT scan of the brain was negative for any acute intracranial abnormalities.  MRA of the head showed normal [place].  MRA of the neck showed normal angiogram of the carotid arteries and vertebral basilar system.  MRI of the brain showed no acute stroke and mild white matter changes that is nonspecific.  Echocardiogram with no significant abnormalities noted.  On notes, patient reported occasional headache followed by visual changes and neurological symptoms.  Patient was advised to follow-up with outpatient neurology for possibly a variant migraines.  Patient was hemodynamically and clinically stable.  Patient was discharged home in stable condition.       Therefore, he is discharged in good and stable condition to home with close outpatient follow-up.    The patient recovered much more quickly than anticipated on admission.    Discharge Date  7/26/18    FOLLOW UP ITEMS POST DISCHARGE  Follow-up with PCP in 1 week.  Follow-up with neurology per PCP referral.    DISCHARGE " DIAGNOSES  Active Problems:    Essential hypertension POA: Yes    Hyperlipidemia POA: Yes  Resolved Problems:    TIA (transient ischemic attack) POA: Yes      FOLLOW UP  No future appointments.  No follow-up provider specified.    MEDICATIONS ON DISCHARGE     Medication List      CONTINUE taking these medications      Instructions   atorvastatin 40 MG Tabs  Commonly known as:  LIPITOR   Take 40 mg by mouth every morning.  Dose:  40 mg     clopidogrel 75 MG Tabs  Commonly known as:  PLAVIX   Take 75 mg by mouth every evening.  Dose:  75 mg     lisinopril 20 MG Tabs  Commonly known as:  PRINIVIL   Take 20 mg by mouth every morning.  Dose:  20 mg            Allergies  No Known Allergies    DIET  Orders Placed This Encounter   Procedures   • Diet Order Regular     Standing Status:   Standing     Number of Occurrences:   1     Order Specific Question:   Diet:     Answer:   Regular [1]       ACTIVITY  As tolerated.  Weight bearing as tolerated    CONSULTATIONS  None    PROCEDURES  None    LABORATORY  Lab Results   Component Value Date    SODIUM 138 07/26/2018    POTASSIUM 3.7 07/26/2018    CHLORIDE 104 07/26/2018    CO2 24 07/26/2018    GLUCOSE 101 (H) 07/26/2018    BUN 15 07/26/2018    CREATININE 1.08 07/26/2018        Lab Results   Component Value Date    WBC 7.0 07/26/2018    HEMOGLOBIN 14.8 07/26/2018    HEMATOCRIT 42.8 07/26/2018    PLATELETCT 280 07/26/2018        Total time of the discharge process exceeds 35 minutes.

## 2018-07-27 NOTE — EEG PROGRESS NOTE
EEG 07/26/18 10:07 PM    ROUTINE VIDEO ELECTROENCEPHALOGRAM REPORT      NAME: Alfred Fagan    REFERRING Dr:DR. CAMARENA    DURATION: 27 minutes    INDICATION: Aphasia      TECHNIQUE: 30 channel routine electroencephalogram (EEG) was performed in accordance with the international 10-20 system. The study was reviewed in bipolar and referential montages. The recording examined the patient during wakeful and drowsy state(s).     DESCRIPTION OF THE RECORD:      Background rhythm during awake stage shows well-organized, well-developed, average voltage 10 to 11 hertz alpha activity in the posterior regions.  It blocks with eye opening and it is bilaterally synchronous and symmetrical.  No spike-and-wave discharges or any lateralizing abnormalities are seen.  Photic stimulation did not produce any abnormalities. No abnormalities were found during the procedure. Stage I sleep was achieved.      ACTIVATION PROCEDURES:      Photic Stimulation were done    ICTAL AND/OR INTERICTAL FINDINGS:    No focal or generalized epileptiform activity noted. No regional slowing was seen during this routine study.  No clinical events or seizures were reported or recorded during the study.      EKG: sampling of the EKG recording demonstrated sinus rhythm.        INTERPRETATION:      ________________________________________________________________________    This is normal routine video EEG recording in the awake and drowsy/sleep state(s).    This scalp video EEG remains not remarkable    Of note, unremarkable EEG does not completely exclude the diagnosis  of seizures since seizure is an episodic phenomena and frontal lobe seizures could have normal scalp EEG. Clinical correlation may help     If clinical suspicion of seizure remains high.  Prolonged outpatient EEG   monitoring may be of help.    EEG 07/26/18 10:09 PM  ________________________________________________________________________

## 2018-07-27 NOTE — DISCHARGE INSTRUCTIONS
Discharge Instructions    Discharged to home by car with relative. Discharged via walking, hospital escort: Refused.  Special equipment needed: Not Applicable    Be sure to schedule a follow-up appointment with your primary care doctor or any specialists as instructed.     Discharge Plan:   Diet Plan: Discussed  Activity Level: Discussed  Confirmed Symptoms Management: Discussed  Medication Reconciliation Updated: Yes  Influenza Vaccine Indication: Patient Refuses    I understand that a diet low in cholesterol, fat, and sodium is recommended for good health. Unless I have been given specific instructions below for another diet, I accept this instruction as my diet prescription.   Other diet: regular    Special Instructions: None    · Is patient discharged on Warfarin / Coumadin?   No     Depression / Suicide Risk    As you are discharged from this [facility] facility, it is important to learn how to keep safe from harming yourself.    Recognize the warning signs:  · Abrupt changes in personality, positive or negative- including increase in energy   · Giving away possessions  · Change in eating patterns- significant weight changes-  positive or negative  · Change in sleeping patterns- unable to sleep or sleeping all the time   · Unwillingness or inability to communicate  · Depression  · Unusual sadness, discouragement and loneliness  · Talk of wanting to die  · Neglect of personal appearance   · Rebelliousness- reckless behavior  · Withdrawal from people/activities they love  · Confusion- inability to concentrate     If you or a loved one observes any of these behaviors or has concerns about self-harm, here's what you can do:  · Talk about it- your feelings and reasons for harming yourself  · Remove any means that you might use to hurt yourself (examples: pills, rope, extension cords, firearm)  · Get professional help from the community (Mental Health, Substance Abuse, psychological counseling)  · Do not be  "alone:Call your Safe Contact- someone whom you trust who will be there for you.  · Call your local CRISIS HOTLINE 669-7254 or 043-559-8769  · Call your local Children's Mobile Crisis Response Team [place] (719) 779-1478 or www.[employer]  · Call the toll free National Suicide Prevention Hotlines   · National Suicide Prevention Lifeline 527-902-BRBY (5722)  · Tumblr Line Network 800-SUICIDE (629-7160)      Transient Ischemic Attack  A transient ischemic attack (TIA) is a \"warning stroke\" that causes stroke-like symptoms. A TIA does not cause lasting damage to the brain. The symptoms of a TIA can happen fast and do not last long. It is important to know the symptoms of a TIA and what to do. This can help prevent stroke or death.  Follow these instructions at home:  · Take medicines only as told by your doctor. Make sure you understand all of the instructions.  · You may need to take aspirin or warfarin medicine. Warfarin needs to be taken exactly as told.  ¨ Taking too much or too little warfarin is dangerous. Blood tests must be done as often as told by your doctor. A PT blood test measures how long it takes for blood to clot. Your PT is used to calculate another value called an INR. Your PT and INR help your doctor adjust your warfarin dosage. He or she will make sure you are taking the right amount.  ¨ Food can cause problems with warfarin and affect the results of your blood tests. This is true for foods high in vitamin K. Eat the same amount of foods high in vitamin K each day. Foods high in vitamin K include spinach, kale, broccoli, cabbage, krystin and turnip greens, [place] sprouts, peas, cauliflower, seaweed, and parsley. Other foods high in vitamin K include beef and pork liver, green tea, and soybean oil. Eat the same amount of foods high in vitamin K each day. Avoid big changes in your diet. Tell your doctor before changing your diet. Talk to a  (dietitian) if you have " questions.  ¨ Many medicines can cause problems with warfarin and affect your PT and INR. Tell your doctor about all medicines you take. This includes vitamins and dietary pills (supplements). Do not take or stop taking any prescribed or over-the-counter medicines unless your doctor tells you to.  ¨ Warfarin can cause more bruising or bleeding. Hold pressure over any cuts for longer than normal. Talk to your doctor about other side effects of warfarin.  ¨ Avoid sports or activities that may cause injury or bleeding.  ¨ Be careful when you shave, floss, or use sharp objects.  ¨ Avoid or drink very little alcohol while taking warfarin. Tell your doctor if you change how much alcohol you drink.  ¨ Tell your dentist and other doctors that you take warfarin before any procedures.  · Follow your diet program as told, if you are given one.  · Keep a healthy weight.  · Stay active. Try to get at least 30 minutes of activity on all or most days.  · Do not use any tobacco products, including cigarettes, chewing tobacco, or electronic cigarettes. If you need help quitting, ask your doctor.  · Limit alcohol intake to no more than 1 drink per day for nonpregnant women and 2 drinks per day for men. One drink equals 12 ounces of beer, 5 ounces of wine, or 1½ ounces of hard liquor.  · Do not abuse drugs.  · Keep your home safe so you do not fall. You can do this by:  ¨ Putting grab bars in the bedroom and bathroom.  ¨ Raising toilet seats.  ¨ Putting a seat in the shower.  · Keep all follow-up visits as told by your doctor. This is important.  Contact a doctor if:  · Your personality changes.  · You have trouble swallowing.  · You have double vision.  · You are dizzy.  · You have a fever.  Get help right away if:  These symptoms may be an emergency. Do not wait to see if the symptoms will go away. Get medical help right away. Call your local emergency services (911 in the [place] Do not drive yourself to the hospital.  · You have  sudden weakness or lose feeling (go numb), especially on one side of the body. This can affect your:  ¨ Face.  ¨ Arm.  ¨ Leg.  · You have sudden trouble walking.  · You have sudden trouble moving your arms or legs.  · You have sudden confusion.  · You have trouble talking.  · You have trouble understanding.  · You have sudden trouble seeing in one or both eyes.  · You lose your balance.  · Your movements are not smooth.  · You have a sudden, very bad headache with no known cause.  · You have new chest pain.  · Your heartbeat is unsteady.  · You are partly or totally unaware of what is going on around you.  This information is not intended to replace advice given to you by your health care provider. Make sure you discuss any questions you have with your health care provider.  Document Released: 09/26/2009 Document Revised: 08/21/2017 Document Reviewed: 03/25/2015  [employer] Interactive Patient Education © 2017 [employer]      Migraine Headache  A migraine headache is a very strong throbbing pain on one side or both sides of your head. Migraines can also cause other symptoms. Talk with your doctor about what things may bring on (trigger) your migraine headaches.  Follow these instructions at home:  Medicines  · Take over-the-counter and prescription medicines only as told by your doctor.  · Do not drive or use heavy machinery while taking prescription pain medicine.  · To prevent or treat constipation while you are taking prescription pain medicine, your doctor may recommend that you:  ¨ Drink enough fluid to keep your pee (urine) clear or pale yellow.  ¨ Take over-the-counter or prescription medicines.  ¨ Eat foods that are high in fiber. These include fresh fruits and vegetables, whole grains, and beans.  ¨ Limit foods that are high in fat and processed sugars. These include fried and sweet foods.  Lifestyle  · Avoid alcohol.  · Do not use any products that contain nicotine or tobacco, such as cigarettes and  e-cigarettes. If you need help quitting, ask your doctor.  · Get at least 8 hours of sleep every night.  · Limit your stress.  General instructions  · Keep a journal to find out what may bring on your migraines. For example, write down:  ¨ What you eat and drink.  ¨ How much sleep you get.  ¨ Any change in what you eat or drink.  ¨ Any change in your medicines.  · If you have a migraine:  ¨ Avoid things that make your symptoms worse, such as bright lights.  ¨ It may help to lie down in a dark, quiet room.  ¨ Do not drive or use heavy machinery.  ¨ Ask your doctor what activities are safe for you.  · Keep all follow-up visits as told by your doctor. This is important.  Contact a doctor if:  · You get a migraine that is different or worse than your usual migraines.  Get help right away if:  · Your migraine gets very bad.  · You have a fever.  · You have a stiff neck.  · You have trouble seeing.  · Your muscles feel weak or like you cannot control them.  · You start to lose your balance a lot.  · You start to have trouble walking.  · You pass out (faint).  This information is not intended to replace advice given to you by your health care provider. Make sure you discuss any questions you have with your health care provider.  Document Released: 09/26/2009 Document Revised: 07/07/2017 Document Reviewed: 06/05/2017  [employer] Interactive Patient Education © 2017 [employer]

## 2019-02-12 NOTE — ADDENDUM NOTE
Encounter addended by: Luma Cartagena on: 2/12/2019  2:40 PM<BR>    Actions taken: Charge Capture section accepted